# Patient Record
Sex: MALE | Race: WHITE | Employment: OTHER | URBAN - METROPOLITAN AREA
[De-identification: names, ages, dates, MRNs, and addresses within clinical notes are randomized per-mention and may not be internally consistent; named-entity substitution may affect disease eponyms.]

---

## 2017-12-04 ENCOUNTER — OFFICE VISIT (OUTPATIENT)
Dept: OPHTHALMOLOGY | Facility: CLINIC | Age: 77
End: 2017-12-04

## 2017-12-04 DIAGNOSIS — H52.13 MYOPIA, BILATERAL: ICD-10-CM

## 2017-12-04 DIAGNOSIS — H02.119 CICATRICIAL ECTROPION, UNSPECIFIED LATERALITY: Primary | ICD-10-CM

## 2017-12-04 ASSESSMENT — REFRACTION_MANIFEST
OD_AXIS: 180
OD_CYLINDER: +0.25
OS_AXIS: 045
OS_ADD: +2.50
OD_SPHERE: -0.50
OD_ADD: +2.50
OS_SPHERE: -2.25
OS_CYLINDER: +1.75

## 2017-12-04 ASSESSMENT — VISUAL ACUITY
OS_CC: 20/15-
OD_CC: 20/15-
CORRECTION_TYPE: GLASSES
METHOD: SNELLEN - LINEAR

## 2017-12-04 ASSESSMENT — CONF VISUAL FIELD
OD_NORMAL: 1
METHOD: COUNTING FINGERS
OS_NORMAL: 1

## 2017-12-04 ASSESSMENT — TONOMETRY
IOP_METHOD: ICARE
OD_IOP_MMHG: 15
OS_IOP_MMHG: 14

## 2017-12-04 ASSESSMENT — REFRACTION_WEARINGRX
OD_AXIS: 180
OS_CYLINDER: +1.75
OD_ADD: +2.50
OD_CYLINDER: +0.25
OS_SPHERE: -2.25
SPECS_TYPE: PAL
OD_SPHERE: -0.50
OS_AXIS: 045
OS_ADD: +2.50

## 2017-12-04 ASSESSMENT — CUP TO DISC RATIO
OD_RATIO: 0.25
OS_RATIO: 0.3

## 2017-12-04 NOTE — MR AVS SNAPSHOT
After Visit Summary   2017    Mariusz Murrieta    MRN: [id number]           Patient Information     Date Of Birth          1940        Visit Information        Provider Department      2017 8:20 AM Gm Morris MD [facility]        Today's Diagnoses     Cicatricial ectropion, unspecified laterality - Both Eyes    -  1    Myopia, bilateral           Follow-ups after your visit        Follow-up notes from your care team     Return in about 1 year (around 2018) for Complete Eye Exam, PCO.      Who to contact     Please call your clinic at [phone] to:    Ask questions about your health    Make or cancel appointments    Discuss your medicines    Learn about your test results    Speak to your doctor   If you have compliments or concerns about an experience at your clinic, or if you wish to file a complaint, please contact [facility] Patient Relations at [phone] or email us at [email or web address]         Additional Information About Your Visit        MyChart Information     [employer] is an electronic gateway that provides easy, online access to your medical records. With [employer], you can request a clinic appointment, read your test results, renew a prescription or communicate with your care team.     To sign up for [employer] visit the website at [email or web address]   You will be asked to enter the access code listed below, as well as some personal information. Please follow the directions to create your username and password.     Your access code is: [email or web address]  Expires: 2018  6:31 AM     Your access code will  in 90 days. If you need help or a new code, please contact your [facility] or call [phone] for assistance.        Care EveryWhere ID     This is your Care EveryWhere ID. This could be used by other organizations to access your [facility]  records  [id number]         Blood Pressure from Last 3 Encounters:   No data found for BP    Weight from Last 3 Encounters:   No data found for Wt              Today, you had the following     No orders found for display       Primary Care Provider Office Phone # Fax #    Jaun GONZALES Post [phone]       [facility] [address]  [place] [address]        Equal Access to Services     [facility] : Hadii aad ku hadasho Soomaali, waaxda luqadaha, qaybta kaalmada adeegyada, waxay idiin hayaan adeeg kharash la'aan . So [facility] [phone].    ATENCIÓN: Si habla espjacob, tiene a reed disposición servicios gratuitos de asistencia lingüística. Llame al [phone].    We comply with applicable federal civil rights laws and [place] laws. We do not discriminate on the basis of race, color, national origin, age, disability, sex, sexual orientation, or gender identity.            Thank you!     Thank you for choosing [facility]  for your care. Our goal is always to provide you with excellent care. Hearing back from our patients is one way we can continue to improve our services. Please take a few minutes to complete the written survey that you may receive in the mail after your visit with us. Thank you!             Your Updated Medication List - Protect others around you: Learn how to safely use, store and throw away your medicines at www.disposemymeds.org.          This list is accurate as of: 12/4/17  9:06 AM.  Always use your most recent med list.                   Brand Name Dispense Instructions for use Diagnosis    LIPITOR PO           WARFARIN SODIUM PO

## 2017-12-04 NOTE — NURSING NOTE
Chief Complaints and History of Present Illnesses   Patient presents with     COMPREHENSIVE EYE EXAM     HPI    Affected eye(s):  Both   Symptoms:     Difficulty with reading (Comment: feels that reading fine print has worsened recently, even with glasses on)      Duration:  1 year   Frequency:  Constant       Do you have eye pain now?:  No      Comments:  Pt here for eye health examination, notes some blurred vision at near for fine print.  No eye pain or discomfort.    JADEN Zelaya 8:23 AM 12/04/2017

## 2017-12-04 NOTE — PROGRESS NOTES
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 or sooner as needed.    Complete documentation of historical and exam elements from today's encounter can  be found in the full encounter summary report (not reduplicated in this progress  note). I personally obtained the chief complaint(s) and history of present illness. I  confirmed and edited as necessary the review of systems, past medical/surgical  history, family history, social history, and examination findings as documented by  others; and I examined the patient myself. I personally reviewed the relevant tests,  images, and reports as documented above. I formulated and edited as necessary the  assessment and plan and discussed the findings and management plan with the  patient and family.    CARLA Morris M.D

## 2018-10-16 ENCOUNTER — TELEPHONE (OUTPATIENT)
Dept: OPHTHALMOLOGY | Facility: CLINIC | Age: 78
End: 2018-10-16

## 2018-11-06 ENCOUNTER — TELEPHONE (OUTPATIENT)
Dept: OPHTHALMOLOGY | Facility: CLINIC | Age: 78
End: 2018-11-06

## 2018-11-06 ENCOUNTER — OFFICE VISIT (OUTPATIENT)
Dept: OPHTHALMOLOGY | Facility: CLINIC | Age: 78
End: 2018-11-06
Payer: MEDICARE

## 2018-11-06 DIAGNOSIS — H26.491 POSTERIOR CAPSULAR OPACIFICATION VISUALLY SIGNIFICANT, RIGHT: Primary | ICD-10-CM

## 2018-11-06 DIAGNOSIS — H52.13 MYOPIA, BILATERAL: ICD-10-CM

## 2018-11-06 DIAGNOSIS — H35.413 BILATERAL RETINAL LATTICE DEGENERATION: ICD-10-CM

## 2018-11-06 DIAGNOSIS — H02.119 CICATRICIAL ECTROPION, UNSPECIFIED LATERALITY: ICD-10-CM

## 2018-11-06 RX ORDER — ASPIRIN 81 MG/1
TABLET ORAL
COMMUNITY
Start: 2008-03-31

## 2018-11-06 RX ORDER — MULTIVIT WITH MINERALS/LUTEIN
1 TABLET ORAL
COMMUNITY
Start: 2010-07-08

## 2018-11-06 RX ORDER — SILDENAFIL 50 MG/1
50 TABLET, FILM COATED ORAL
COMMUNITY
Start: 2018-05-10

## 2018-11-06 ASSESSMENT — VISUAL ACUITY
CORRECTION_TYPE: GLASSES
OS_CC+: +2
OD_CC: J2
OD_CC+: +2
METHOD: SNELLEN - LINEAR
OS_CC: 20/25
OD_CC: 20/40
OS_CC: J2

## 2018-11-06 ASSESSMENT — REFRACTION_MANIFEST
OD_CYLINDER: +0.25
OD_AXIS: 150
OD_SPHERE: -0.50
OS_SPHERE: -2.25
OS_CYLINDER: +2.00
OS_AXIS: 050

## 2018-11-06 ASSESSMENT — CUP TO DISC RATIO
OS_RATIO: 0.3
OD_RATIO: 0.25

## 2018-11-06 ASSESSMENT — CONF VISUAL FIELD
OS_NORMAL: 1
OD_NORMAL: 1

## 2018-11-06 ASSESSMENT — EXTERNAL EXAM - RIGHT EYE: OD_EXAM: NORMAL

## 2018-11-06 ASSESSMENT — TONOMETRY
OD_IOP_MMHG: 15
OS_IOP_MMHG: 13
IOP_METHOD: ICARE

## 2018-11-06 ASSESSMENT — REFRACTION_WEARINGRX
OS_ADD: +2.50
OS_AXIS: 045
OS_CYLINDER: +1.75
SPECS_TYPE: PAL
OD_CYLINDER: +0.25
OS_SPHERE: -2.25
OD_AXIS: 180
OD_ADD: +2.50
OD_SPHERE: -0.50

## 2018-11-06 ASSESSMENT — EXTERNAL EXAM - LEFT EYE: OS_EXAM: NORMAL

## 2018-11-06 NOTE — MR AVS SNAPSHOT
After Visit Summary   2018    Mariusz Murrieta    MRN: [id number]           Patient Information     Date Of Birth          1940        Visit Information        Provider Department      2018 8:40 AM Deirdre Bahena MD 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      Your next 10 appointments already scheduled     Dec 11, 2018  8:00 AM CST   Return Visit with Deirdre Bahena MD   [facility]    [facility]  [address] [id number] [phone]              Who to contact     Please call your clinic at [phone] to:    Ask questions about your health    Make or cancel appointments    Discuss your medicines    Learn about your test results    Speak to your doctor            Additional Information About Your Visit        MyChart Information     [employer] is an electronic gateway that provides easy, online access to your medical records. With [employer], you can request a clinic appointment, read your test results, renew a prescription or communicate with your care team.     To sign up for [employer] visit the website at [email or web address]   You will be asked to enter the access code listed below, as well as some personal information. Please follow the directions to create your username and password.     Your access code is: [email or web address]  Expires: 2019  5:31 AM     Your access code will  in 90 days. If you need help or a new code, please contact  your [facility] or call [phone] for assistance.        Care EveryWhere ID     This is your Care EveryWhere ID. This could be used by other organizations to access your [place] medical records  [id number]         Blood Pressure from Last 3 Encounters:   No data found for BP    Weight from Last 3 Encounters:   No data found for Wt              Today, you had the following     No orders found for display       Primary Care Provider Office Phone # Fax #    Jaun GONZALES Post [phone]       [facility] [address]  [facility] [address]        Equal Access to Services     [facility]: Hadii aad ku hadasho Soomaali, waaxda luqadaha, qaybta kaalmada adeegyada, waxay idiin hayaan adeeg kharaevita lamahin . So [facility] [phone].    ATENCIÓN: Si habla español, tiene a reed disposición servicios gratuitos de asistencia lingüística. [facility] [phone].    We comply with applicable federal civil rights laws and [place] laws. We do not discriminate on the basis of race, color, national origin, age, disability, sex, sexual orientation, or gender identity.            Thank you!     Thank you for choosing [facility]  for your care. Our goal is always to provide you with excellent care. Hearing back from our patients is one way we can continue to improve our services. Please take a few minutes to complete the written survey that you may receive in the mail after your visit with us. Thank you!             Your Updated Medication List - Protect others around you: Learn how to safely use, store and throw away your medicines at www.disposemymeds.org.          This list is accurate as of 11/6/18 10:17 AM.  Always use your most recent med list.                   Brand Name Dispense Instructions for use Diagnosis    aspirin 81 MG EC tablet           CENTRUM SILVER per tablet      Take 1 tablet by mouth        LIPITOR PO           sildenafil 50  MG tablet    VIAGRA     Take 50 mg by mouth        VITAMIN D-1000 MAX ST 1000 units Tabs   Generic drug:  cholecalciferol      Take 1,000 Units by mouth        WARFARIN SODIUM PO

## 2018-11-06 NOTE — TELEPHONE ENCOUNTER
[facility] Call Center    Phone Message    May a detailed message be left on voicemail: yes    Reason for Call: Other: Pt states that he is scheduled for surgery on 12/11. Pt is flying to [facility] on 12/22. Pt is wondering if there are any 0problems with having this eye surgery only 10 days before flying. Please f/u with Pt to advise. Ok to leave detailed vm on phone.     Action Taken: Message routed to:  [facility]       Spoke with pt and reassured that he is cleared to fly with no restrictions per Dr. Bahena.        Cornelia DANIELLE 10:41 AM 11/07/2018

## 2018-11-06 NOTE — PROGRESS NOTES
HPI  Mariusz Murrieta is a 78 year old male here for comprehensive eye exam and noting problems with reading small print (maps).  No eye pain or new floaters.  Occasional tearing both eyes.     PMH:  Hyperlipidemia, on warfarin status-post cva   POH:  Glasses for myopia status-post cataract extraction posterior chamber intraocular lens (PCIOL) both eyes 2016, status-post laser retinopexy left eye prior to 2007, no trauma  Oc Meds:  none  FH:  enies any glaucoma, age related macular degeneration, or other known eye diseases       Assessment & Plan      (H26.491) Posterior capsular opacification visually significant, right - Right Eye  (primary encounter diagnosis)  Comment: visually significant, patient not sure if trouble reading is due to RIGHT eye   Plan: discussed with patient about YAG capsulotomy, risks, benefits, and alternatives and gave educational material.  He will think about this and possibly return for YAG capsulotomy right eye, will note if RIGHT eye is blurrier than left for reading at home.     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      -----------------------------------------------------------------------------------    Patient disposition:   Return in about 1 month (around 12/6/2018) for Follow Up- PCO YAG OD. Call for sooner appointment as needed.    Complete documentation of historical and exam elements from today's encounter can be found in the full encounter summary report (not reduplicated in this progress note). I personally obtained the chief complaint(s) and history of present  illness.  I have confirmed and edited as necessary the CC, HPI, PMH/PSH, social history, FMH, ROS, and exam/neuro findings as obtained by the technician or others. I have examined this patient myself and I personally viewed the image(s) and studies listed above and the documentation reflects my findings and interpretation.  I formulated and edited as necessary the assessment and plan and discussed the findings and management plan with the patient and family.     Deirdre Bahena MD

## 2018-12-06 ENCOUNTER — TELEPHONE (OUTPATIENT)
Dept: OPHTHALMOLOGY | Facility: CLINIC | Age: 78
End: 2018-12-06

## 2018-12-06 NOTE — TELEPHONE ENCOUNTER
"[facility] Call Center    Phone Message    May a detailed message be left on voicemail: yes    Reason for Call: Other: Pt wanted Dr Bahena to know that his history may not include his use of \"FLOWMAX\" for over two years many years ago.  Pt is concerned it may effect his 12/11 laser cornea surgery.     Action Taken: Message routed to:  [facility] eye clinic    " Bedside report received from Dudley Goodwin RN. Pt in the bed on the phone. No distress.

## 2018-12-07 NOTE — TELEPHONE ENCOUNTER
Spoke with patient and let him know that it wouldn't be a problem that he was on Flomax.    Lynn Burns, COT 3:46 PM 12/07/2018

## 2018-12-11 ENCOUNTER — OFFICE VISIT (OUTPATIENT)
Dept: OPHTHALMOLOGY | Facility: CLINIC | Age: 78
End: 2018-12-11
Payer: MEDICARE

## 2018-12-11 DIAGNOSIS — H35.413 BILATERAL RETINAL LATTICE DEGENERATION: ICD-10-CM

## 2018-12-11 DIAGNOSIS — H26.491 PCO (POSTERIOR CAPSULAR OPACIFICATION), RIGHT: Primary | ICD-10-CM

## 2018-12-11 ASSESSMENT — EXTERNAL EXAM - RIGHT EYE: OD_EXAM: NORMAL

## 2018-12-11 ASSESSMENT — CUP TO DISC RATIO
OS_RATIO: 0.3
OD_RATIO: 0.25

## 2018-12-11 ASSESSMENT — VISUAL ACUITY
OS_CC+: -2
OS_CC: 20/20
CORRECTION_TYPE: GLASSES
OD_CC: 20/30
METHOD: SNELLEN - LINEAR
OD_CC+: -1

## 2018-12-11 ASSESSMENT — CONF VISUAL FIELD
METHOD: COUNTING FINGERS
OD_NORMAL: 1
OS_NORMAL: 1

## 2018-12-11 ASSESSMENT — TONOMETRY
OD_IOP_MMHG: 11
IOP_METHOD: TONOPEN
OS_IOP_MMHG: 11

## 2018-12-11 ASSESSMENT — EXTERNAL EXAM - LEFT EYE: OS_EXAM: NORMAL

## 2018-12-11 NOTE — PROGRESS NOTES
HPI  Mariusz Murrieta is a 78 year old male here for comprehensive eye exam and noting persistent problems with reading small print (maps) with his RIGHT eye.  No eye pain or new floaters.      PMH:  Hyperlipidemia, on warfarin status-post cva   POH:  Glasses for myopia status-post cataract extraction posterior chamber intraocular lens (PCIOL) both eyes 2016, status-post laser retinopexy left eye prior to 2007, no trauma  Oc Meds:  none  FH:  enies any glaucoma, age related macular degeneration, or other known eye diseases       Assessment & Plan      (H26.491) Posterior capsular opacification visually significant, right - Right Eye  (primary encounter diagnosis)  Comment: visually significant  The risks, benefits and alternatives were discussed with patient.  Consent was signed by patient.  The patient tolerated the procedure well, and there were no apparent complications. See procedure note.    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 Call for sooner appointment as needed.    Complete documentation of historical and exam elements from today's encounter can be found in the full encounter summary report (not reduplicated in this progress note). I personally obtained the chief complaint(s) and history of present illness.  I have confirmed and edited as necessary the CC, HPI, PMH/PSH, social history, FMH, ROS, and exam/neuro findings as obtained by the technician or others. I have examined this  patient myself and I personally viewed the image(s) and studies listed above and the documentation reflects my findings and interpretation.  I formulated and edited as necessary the assessment and plan and discussed the findings and management plan with the patient and family.     Deirdre Bahena MD

## 2019-01-11 ENCOUNTER — OFFICE VISIT (OUTPATIENT)
Dept: OPHTHALMOLOGY | Facility: CLINIC | Age: 79
End: 2019-01-11
Payer: MEDICARE

## 2019-01-11 DIAGNOSIS — H26.491 PCO (POSTERIOR CAPSULAR OPACIFICATION), RIGHT: Primary | ICD-10-CM

## 2019-01-11 DIAGNOSIS — H01.003 BLEPHARITIS OF BOTH EYES, UNSPECIFIED EYELID, UNSPECIFIED TYPE: ICD-10-CM

## 2019-01-11 DIAGNOSIS — H01.006 BLEPHARITIS OF BOTH EYES, UNSPECIFIED EYELID, UNSPECIFIED TYPE: ICD-10-CM

## 2019-01-11 RX ORDER — OMEPRAZOLE 10 MG/1
20 CAPSULE, DELAYED RELEASE ORAL DAILY
COMMUNITY

## 2019-01-11 ASSESSMENT — REFRACTION_MANIFEST
OS_ADD: +2.50
OD_SPHERE: -0.75
OS_CYLINDER: +1.75
OS_AXIS: 045
OD_AXIS: 180
OS_SPHERE: -2.25
OD_CYLINDER: +0.25
OD_ADD: +2.50

## 2019-01-11 ASSESSMENT — REFRACTION_WEARINGRX
OD_SPHERE: -0.50
OS_AXIS: 045
OD_AXIS: 180
OD_ADD: +2.50
OD_CYLINDER: +0.25
OS_CYLINDER: +1.75
OS_SPHERE: -2.25
OS_ADD: +2.50

## 2019-01-11 ASSESSMENT — TONOMETRY
IOP_METHOD: TONOPEN
OS_IOP_MMHG: 11
OD_IOP_MMHG: 12

## 2019-01-11 ASSESSMENT — EXTERNAL EXAM - LEFT EYE: OS_EXAM: NORMAL

## 2019-01-11 ASSESSMENT — VISUAL ACUITY
CORRECTION_TYPE: GLASSES
OS_CC+: -2
OD_CC: 20/20
OS_CC: 20/20
METHOD: SNELLEN - LINEAR
OD_CC+: -1

## 2019-01-11 ASSESSMENT — CUP TO DISC RATIO: OD_RATIO: 0.25

## 2019-01-11 ASSESSMENT — EXTERNAL EXAM - RIGHT EYE: OD_EXAM: NORMAL

## 2019-01-11 NOTE — PROGRESS NOTES
HPI  Mariusz Murrieta is a 78 year old male here for post op YAG capsulotomy right eye. No eye pain or new floaters.  Notes intermittent blurring right eye that resolves with wiping the eye.  No foreign body sensation.    PMH:  Hyperlipidemia, on warfarin status-post cva   POH:  Glasses for myopia status-post cataract extraction posterior chamber intraocular lens (PCIOL) both eyes 2016, status-post laser retinopexy left eye prior to 2007, no trauma, status-post YAG capsulotomy right eye 12/18  Oc Meds:  none  FH:  enies any glaucoma, age related macular degeneration, or other known eye diseases       Assessment & Plan      (H26.491) Posterior capsular opacification visually significant, right - Right Eye  (primary encounter diagnosis)  Comment: status-post YAG capsulotomy vision improved  Stable fundus exam no retinal tears  Plan: patient declines new glasses, continue same    (H01.003,  H01.006) Blepharitis - Both Eyes  Comment:  Mild with tear film debris (also worse due to punctal ectropion both eyes)  Plan:   Warm compresses daily   Lid scrubs -use diluted baby shampoo on warm washcloth  or cotton-tipped applicator to clean lid margins carefully from side to side   Artificial tears up to 4-6 times per day as needed for discomfort     -----------------------------------------------------------------------------------    Patient disposition:   Return in about 1 year (around 1/11/2020). Call for sooner appointment as needed.    Complete documentation of historical and exam elements from today's encounter can be found in the full encounter summary report (not reduplicated in this progress note). I personally obtained the chief complaint(s) and history of present illness.  I have confirmed and edited as necessary the CC, HPI, PMH/PSH, social history, FMH, ROS, and exam/neuro findings as obtained by the technician or others. I have examined this patient myself and I personally viewed the image(s) and studies listed  above and the documentation reflects my findings and interpretation.  I formulated and edited as necessary the assessment and plan and discussed the findings and management plan with the patient and family.     Deirdre Bahena MD

## 2019-01-11 NOTE — NURSING NOTE
Chief Complaints and History of Present Illnesses   Patient presents with     Post Op (Ophthalmology) Right Eye     Chief Complaint(s) and History of Present Illness(es)     Post Op (Ophthalmology) Right Eye     Laterality: right eye              Comments     S/P Yag Cap RE 12-11-18. Notes that he is noticing some improvement. Denies any floaters or flashing lights. BE feel good and comfortable. RICCI WORLEY, COA 10:26 AM 01/11/2019

## 2019-12-10 ENCOUNTER — PRE VISIT (OUTPATIENT)
Dept: OPHTHALMOLOGY | Facility: CLINIC | Age: 79
End: 2019-12-10

## 2019-12-10 ENCOUNTER — OFFICE VISIT (OUTPATIENT)
Dept: OPHTHALMOLOGY | Facility: CLINIC | Age: 79
End: 2019-12-10
Payer: MEDICARE

## 2019-12-10 DIAGNOSIS — H04.123 DRY EYES, BILATERAL: ICD-10-CM

## 2019-12-10 DIAGNOSIS — Z96.1 PSEUDOPHAKIA OF BOTH EYES: Primary | ICD-10-CM

## 2019-12-10 DIAGNOSIS — H52.13 MYOPIC ASTIGMATISM OF BOTH EYES: ICD-10-CM

## 2019-12-10 DIAGNOSIS — H52.203 MYOPIC ASTIGMATISM OF BOTH EYES: ICD-10-CM

## 2019-12-10 ASSESSMENT — REFRACTION_WEARINGRX
OD_CYLINDER: +0.25
OD_AXIS: 173
OS_AXIS: 045
OD_SPHERE: -0.75
OS_CYLINDER: +1.75
OS_SPHERE: -2.25
OS_ADD: +2.50
SPECS_TYPE: PAL
OD_ADD: +2.50

## 2019-12-10 ASSESSMENT — VISUAL ACUITY
CORRECTION_TYPE: GLASSES
OD_CC: 20/20
OS_CC+: -1
OS_CC: 20/20
METHOD: SNELLEN - LINEAR
OD_CC+: -1

## 2019-12-10 ASSESSMENT — REFRACTION_MANIFEST
OD_CYLINDER: +0.50
OD_ADD: +2.50
OD_SPHERE: -0.75
OS_AXIS: 0145
OD_AXIS: 010
OS_ADD: +2.50
OS_CYLINDER: +2.00
OS_SPHERE: -2.50

## 2019-12-10 ASSESSMENT — EXTERNAL EXAM - RIGHT EYE: OD_EXAM: NORMAL

## 2019-12-10 ASSESSMENT — CONF VISUAL FIELD
OD_NORMAL: 1
OS_NORMAL: 1
METHOD: COUNTING FINGERS

## 2019-12-10 ASSESSMENT — CUP TO DISC RATIO
OS_RATIO: 0.25
OD_RATIO: 0.25

## 2019-12-10 ASSESSMENT — TONOMETRY
IOP_METHOD: APPLANATION
OS_IOP_MMHG: 14
OD_IOP_MMHG: 15

## 2019-12-10 ASSESSMENT — EXTERNAL EXAM - LEFT EYE: OS_EXAM: NORMAL

## 2019-12-10 NOTE — TELEPHONE ENCOUNTER
FUTURE VISIT INFORMATION      FUTURE VISIT INFORMATION:    Date: 12/10/19    Time: 9:00am    [place] [facility] [place]FORMATION:    Referring provider:  Self    Referring providers clinic:  N/A    Reason for visit/diagnosis  Annual eye exam    RECORDS REQUESTED FROM:       Clinic name Comments Records Status Imaging Status   [facility] OV/notes10/6/15-1/11/19 EPIC

## 2019-12-10 NOTE — NURSING NOTE
Chief Complaint(s) and History of Present Illness(es)     Annual Eye Exam     In both eyes.  Associated symptoms include tearing.  Negative for dryness, eye pain and redness.  Pain was noted as 0/10.              Comments     Pt is here for his yearly eye exam. Pt notes some blurriness when reading intermittently with current glasses over the last year or so. Pt still notes BE tearing RE>LE x the last year. Dist vision is good with glasses.    Ocular meds: none    ROLANDO Bolanos 8:59 AM December 10, 2019

## 2019-12-10 NOTE — PROGRESS NOTES
HPI  Mariusz Murrieta is a 79 year old male here for comprehensive eye exam.  Good distance visual acuity with current glasses and sometimes ok to read.  Does have intermittent blurring right eye that resolves with wiping the eye and gets blurry both eyes with reading sometimes (after reading for awhile)  No eye pain or redness.     PMH:  Hyperlipidemia, on warfarin status-post cva   POH:  Glasses for myopia status-post cataract extraction posterior chamber intraocular lens (PCIOL) both eyes 2016, status-post laser retinopexy left eye prior to 2007, no trauma, status-post YAG capsulotomy right eye 12/18, epiphora R>L  Oc Meds:  none  FH:  Denies any glaucoma, age related macular degeneration, or other known eye diseases       Assessment & Plan      (Z96.1) Pseudophakia of both eyes - Both Eyes  (primary encounter diagnosis)  Comment: clear media   Plan: follow    (H52.203,  H52.13) Myopic astigmatism of both eyes - Both Eyes  Comment: minimal change   Plan: manifest refraction done and prescription for glasses given     (H04.123) Dry eyes, bilateral - Both Eyes  Comment: mild no cornea signs   Likely cause of blurring when reading  Plan: trial of artificial tear drops a few minutes before reading see if time can extend   Can use four times a day both eyes   Consider humidifier in winter     -----------------------------------------------------------------------------------    Patient disposition:   Return in about 1 year (around 12/10/2020) for Comprehensive Exam. Call for sooner appointment as needed.    Complete documentation of historical and exam elements from today's encounter can be found in the full encounter summary report (not reduplicated in this progress note). I personally obtained the chief complaint(s) and history of present illness.  I have confirmed and edited as necessary the CC, HPI, PMH/PSH, social history, FMH, ROS, and exam/neuro findings as obtained by the technician or others. I have examined  this patient myself and I personally viewed the image(s) and studies listed above and the documentation reflects my findings and interpretation.  I formulated and edited as necessary the assessment and plan and discussed the findings and management plan with the patient and family.     Deirdre Bahena MD

## 2020-09-23 ENCOUNTER — APPOINTMENT (OUTPATIENT)
Dept: URBAN - METROPOLITAN AREA CLINIC 259 | Age: 80
Setting detail: DERMATOLOGY
End: 2020-09-23

## 2020-09-23 DIAGNOSIS — D22 MELANOCYTIC NEVI: ICD-10-CM

## 2020-09-23 DIAGNOSIS — L57.0 ACTINIC KERATOSIS: ICD-10-CM

## 2020-09-23 DIAGNOSIS — L81.4 OTHER MELANIN HYPERPIGMENTATION: ICD-10-CM

## 2020-09-23 DIAGNOSIS — L40.0 PSORIASIS VULGARIS: ICD-10-CM

## 2020-09-23 DIAGNOSIS — L82.1 OTHER SEBORRHEIC KERATOSIS: ICD-10-CM

## 2020-09-23 DIAGNOSIS — Z80.8 FAMILY HISTORY OF MALIGNANT NEOPLASM OF OTHER ORGANS OR SYSTEMS: ICD-10-CM

## 2020-09-23 DIAGNOSIS — L57.8 OTHER SKIN CHANGES DUE TO CHRONIC EXPOSURE TO NONIONIZING RADIATION: ICD-10-CM

## 2020-09-23 PROBLEM — D22.5 MELANOCYTIC NEVI OF TRUNK: Status: ACTIVE | Noted: 2020-09-23

## 2020-09-23 PROCEDURE — 17000 DESTRUCT PREMALG LESION: CPT

## 2020-09-23 PROCEDURE — 17003 DESTRUCT PREMALG LES 2-14: CPT

## 2020-09-23 PROCEDURE — OTHER LIQUID NITROGEN: OTHER

## 2020-09-23 PROCEDURE — OTHER COUNSELING: OTHER

## 2020-09-23 PROCEDURE — 99214 OFFICE O/P EST MOD 30 MIN: CPT | Mod: 25

## 2020-09-23 ASSESSMENT — LOCATION DETAILED DESCRIPTION DERM
LOCATION DETAILED: RIGHT KNEE
LOCATION DETAILED: RIGHT MID TEMPLE
LOCATION DETAILED: RIGHT SUPERIOR LATERAL MALAR CHEEK
LOCATION DETAILED: LEFT SUPERIOR UPPER BACK
LOCATION DETAILED: LEFT KNEE
LOCATION DETAILED: LEFT INFERIOR UPPER BACK
LOCATION DETAILED: LEFT MEDIAL UPPER BACK

## 2020-09-23 ASSESSMENT — LOCATION SIMPLE DESCRIPTION DERM
LOCATION SIMPLE: RIGHT TEMPLE
LOCATION SIMPLE: LEFT KNEE
LOCATION SIMPLE: RIGHT KNEE
LOCATION SIMPLE: RIGHT CHEEK
LOCATION SIMPLE: LEFT UPPER BACK

## 2020-09-23 ASSESSMENT — LOCATION ZONE DERM
LOCATION ZONE: FACE
LOCATION ZONE: LEG
LOCATION ZONE: TRUNK

## 2020-09-23 NOTE — PROCEDURE: LIQUID NITROGEN
Detail Level: Simple
Render Post-Care Instructions In Note?: no
Duration Of Freeze Thaw-Cycle (Seconds): 1
Number Of Freeze-Thaw Cycles: 1 freeze-thaw cycle
Consent: The patient's consent was obtained including but not limited to risks of crusting, scabbing, blistering, scarring, darker or lighter pigmentary change, recurrence, incomplete removal and infection.
Post-Care Instructions: I reviewed with the patient in detail post-care instructions. Patient is to wear sunprotection, and avoid picking at any of the treated lesions. Pt may apply Vaseline to crusted or scabbing areas.

## 2020-12-07 NOTE — PROGRESS NOTES
HPI  Mariusz Murrieta is a 80 year old male here for comprehensive eye exam.  Good distance visual acuity with current glasses with some problems with very small print up close.  Longstanding intermittent tearing right eye more than left eye.   Does not feel eyes are dry lately.  No new flashes/floaters.    PMH:  Hyperlipidemia, on warfarin status-post cva   POH:  Glasses for myopia status-post cataract extraction posterior chamber intraocular lens (PCIOL) both eyes 2016, status-post laser retinopexy left eye prior to 2007, no trauma, status-post YAG capsulotomy right eye 12/18, epiphora R>L  Oc Meds:  none  FH:  Denies any glaucoma, age related macular degeneration, or other known eye diseases       Assessment & Plan        (Z96.1) Pseudophakia of both eyes - Both Eyes  (primary encounter diagnosis)  Comment: clear, good visual acuity   Plan: follow    (H52.203,  H52.13) Myopic astigmatism of both eyes - Both Eyes  Comment: minimal changes, can increase add a little but also recommend adjusting glasses for occasional very small print (likes smaller frame don't go up more on add)  Plan:      Manifest refraction done and prescription for glasses given     (H04.123) Dry eyes, bilateral - Both Eyes  Comment: no cornea signs, likely small component of dry eye /reflex tearing but more likely tearing due to ectropion  Plan: artificial tear drops before reading as needed and oculoplastics evaluation as desired to determine if surgery indicated    (H26.491) PCO (posterior capsular opacification), left eye  Comment: mild   Plan: follow, call with worsening vision    (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  Call for sooner appointment as needed.    Complete documentation of historical and exam elements from today's encounter can be found in the full encounter summary report (not reduplicated in this progress note). I personally obtained the chief complaint(s) and history of present illness.  I have confirmed and edited as necessary the CC, HPI, PMH/PSH, social history, FMH, ROS, and exam/neuro findings as obtained by the technician or others. I have examined this patient myself and I personally viewed the image(s) and studies listed above and the documentation reflects my findings and interpretation.  I formulated and edited as necessary the assessment and plan and discussed the findings and management plan with the patient and family.     Deirdre Bahena MD

## 2020-12-08 ENCOUNTER — OFFICE VISIT (OUTPATIENT)
Dept: OPHTHALMOLOGY | Facility: CLINIC | Age: 80
End: 2020-12-08
Payer: MEDICARE

## 2020-12-08 DIAGNOSIS — H52.13 MYOPIC ASTIGMATISM OF BOTH EYES: ICD-10-CM

## 2020-12-08 DIAGNOSIS — Z96.1 PSEUDOPHAKIA OF BOTH EYES: Primary | ICD-10-CM

## 2020-12-08 DIAGNOSIS — H52.203 MYOPIC ASTIGMATISM OF BOTH EYES: ICD-10-CM

## 2020-12-08 DIAGNOSIS — H04.123 DRY EYES, BILATERAL: ICD-10-CM

## 2020-12-08 DIAGNOSIS — H26.491 PCO (POSTERIOR CAPSULAR OPACIFICATION), RIGHT: ICD-10-CM

## 2020-12-08 DIAGNOSIS — H35.413 BILATERAL RETINAL LATTICE DEGENERATION: ICD-10-CM

## 2020-12-08 PROCEDURE — 92014 COMPRE OPH EXAM EST PT 1/>: CPT | Performed by: OPHTHALMOLOGY

## 2020-12-08 PROCEDURE — 92015 DETERMINE REFRACTIVE STATE: CPT | Mod: GY | Performed by: OPHTHALMOLOGY

## 2020-12-08 ASSESSMENT — TONOMETRY
OD_IOP_MMHG: 13
OS_IOP_MMHG: 12
IOP_METHOD: ICARE

## 2020-12-08 ASSESSMENT — VISUAL ACUITY
OS_CC+: -2
CORRECTION_TYPE: GLASSES
METHOD: SNELLEN - LINEAR
OS_CC: 20/20
OD_CC: 20/20

## 2020-12-08 ASSESSMENT — REFRACTION_MANIFEST
OS_ADD: +2.50
OD_ADD: +2.50
OS_CYLINDER: +2.00
OD_SPHERE: -0.75
OD_CYLINDER: +0.50
OD_AXIS: 173
OS_AXIS: 045
OS_SPHERE: -2.75

## 2020-12-08 ASSESSMENT — CUP TO DISC RATIO
OS_RATIO: 0.25
OD_RATIO: 0.25

## 2020-12-08 ASSESSMENT — REFRACTION_WEARINGRX
OS_ADD: +2.50
OS_CYLINDER: +1.75
SPECS_TYPE: PAL
OS_AXIS: 045
OS_SPHERE: -2.25
OD_SPHERE: -0.75
OD_AXIS: 173
OD_CYLINDER: +0.25
OD_ADD: +2.50

## 2020-12-08 ASSESSMENT — EXTERNAL EXAM - RIGHT EYE: OD_EXAM: NORMAL

## 2020-12-08 ASSESSMENT — CONF VISUAL FIELD
METHOD: COUNTING FINGERS
OS_NORMAL: 1
OD_NORMAL: 1

## 2020-12-08 ASSESSMENT — EXTERNAL EXAM - LEFT EYE: OS_EXAM: NORMAL

## 2020-12-08 NOTE — NURSING NOTE
Chief Complaints and History of Present Illnesses   Patient presents with     Follow Up     Pseudophakia of both eyes - Both Eyes      Chief Complaint(s) and History of Present Illness(es)     Follow Up     Laterality: both eyes    Onset: gradual    Onset: years ago    Course: stable    Associated symptoms: tearing and floaters.  Negative for eye pain, dryness, flashes, glare and haloes    Pain scale: 0/10    Comments: Pseudophakia of both eyes - Both Eyes               Comments     Pt states vision is stable except near vision feels a little worse.  Floaters are stable.    SHASHI Johnson December 8, 2020 8:49 AM

## 2021-10-18 ENCOUNTER — TELEPHONE (OUTPATIENT)
Dept: OPHTHALMOLOGY | Facility: CLINIC | Age: 81
End: 2021-10-18

## 2021-10-18 NOTE — TELEPHONE ENCOUNTER
Spoke with patient regarding scheduling for a sooner appointment from the wait-list. Patient declined scheduling and asked to cancel current appointment. Cancelled appointment as requested and patient declined scheduling at this time as he is looking into Surgery options.-Per Patient

## 2021-11-04 ENCOUNTER — APPOINTMENT (OUTPATIENT)
Dept: URBAN - METROPOLITAN AREA CLINIC 257 | Age: 81
Setting detail: DERMATOLOGY
End: 2021-11-04

## 2021-11-04 DIAGNOSIS — D22 MELANOCYTIC NEVI: ICD-10-CM

## 2021-11-04 DIAGNOSIS — L81.4 OTHER MELANIN HYPERPIGMENTATION: ICD-10-CM

## 2021-11-04 DIAGNOSIS — Z71.89 OTHER SPECIFIED COUNSELING: ICD-10-CM

## 2021-11-04 DIAGNOSIS — L57.8 OTHER SKIN CHANGES DUE TO CHRONIC EXPOSURE TO NONIONIZING RADIATION: ICD-10-CM

## 2021-11-04 DIAGNOSIS — D18.0 HEMANGIOMA: ICD-10-CM

## 2021-11-04 DIAGNOSIS — L72.8 OTHER FOLLICULAR CYSTS OF THE SKIN AND SUBCUTANEOUS TISSUE: ICD-10-CM

## 2021-11-04 DIAGNOSIS — L57.0 ACTINIC KERATOSIS: ICD-10-CM

## 2021-11-04 DIAGNOSIS — L82.1 OTHER SEBORRHEIC KERATOSIS: ICD-10-CM

## 2021-11-04 DIAGNOSIS — Z80.8 FAMILY HISTORY OF MALIGNANT NEOPLASM OF OTHER ORGANS OR SYSTEMS: ICD-10-CM

## 2021-11-04 PROBLEM — D22.5 MELANOCYTIC NEVI OF TRUNK: Status: ACTIVE | Noted: 2021-11-04

## 2021-11-04 PROBLEM — D18.01 HEMANGIOMA OF SKIN AND SUBCUTANEOUS TISSUE: Status: ACTIVE | Noted: 2021-11-04

## 2021-11-04 PROCEDURE — OTHER REASSURANCE: OTHER

## 2021-11-04 PROCEDURE — OTHER LIQUID NITROGEN: OTHER

## 2021-11-04 PROCEDURE — 99213 OFFICE O/P EST LOW 20 MIN: CPT | Mod: 25

## 2021-11-04 PROCEDURE — OTHER COUNSELING: OTHER

## 2021-11-04 PROCEDURE — 17000 DESTRUCT PREMALG LESION: CPT

## 2021-11-04 PROCEDURE — OTHER MIPS QUALITY: OTHER

## 2021-11-04 ASSESSMENT — LOCATION ZONE DERM
LOCATION ZONE: SCALP
LOCATION ZONE: LIP
LOCATION ZONE: TRUNK

## 2021-11-04 ASSESSMENT — LOCATION SIMPLE DESCRIPTION DERM
LOCATION SIMPLE: RIGHT UPPER BACK
LOCATION SIMPLE: UPPER BACK
LOCATION SIMPLE: SCALP
LOCATION SIMPLE: LEFT UPPER BACK
LOCATION SIMPLE: LEFT LIP

## 2021-11-04 ASSESSMENT — LOCATION DETAILED DESCRIPTION DERM
LOCATION DETAILED: LEFT INFERIOR MEDIAL UPPER BACK
LOCATION DETAILED: LEFT SUPERIOR PARIETAL SCALP
LOCATION DETAILED: RIGHT SUPERIOR MEDIAL UPPER BACK
LOCATION DETAILED: LEFT MEDIAL UPPER BACK
LOCATION DETAILED: LEFT LOWER CUTANEOUS LIP
LOCATION DETAILED: INFERIOR THORACIC SPINE

## 2021-11-04 NOTE — PROCEDURE: MIPS QUALITY
Quality 130: Documentation Of Current Medications In The Medical Record: Current Medications Documented
Quality 431: Preventive Care And Screening: Unhealthy Alcohol Use - Screening: Patient not identified as an unhealthy alcohol user when screened for unhealthy alcohol use using a systematic screening method
Detail Level: Generalized
Quality 226: Preventive Care And Screening: Tobacco Use: Screening And Cessation Intervention: Patient screened for tobacco use and is an ex/non-smoker
Quality 110: Preventive Care And Screening: Influenza Immunization: Influenza Immunization Ordered or Recommended, but not Administered due to system reason

## 2021-11-04 NOTE — PROCEDURE: LIQUID NITROGEN
Consent: The patient's consent was obtained including but not limited to risks of crusting, scabbing, blistering, scarring, darker or lighter pigmentary change, recurrence, incomplete removal and infection.
Show Applicator Variable?: Yes
Number Of Freeze-Thaw Cycles: 1 freeze-thaw cycle
Post-Care Instructions: I reviewed with the patient in detail post-care instructions. Patient is to wear sunprotection, and avoid picking at any of the treated lesions. Pt may apply Vaseline to crusted or scabbing areas.
Render Post-Care Instructions In Note?: no
Detail Level: Detailed
Duration Of Freeze Thaw-Cycle (Seconds): 1

## 2022-04-04 ENCOUNTER — APPOINTMENT (OUTPATIENT)
Dept: URBAN - METROPOLITAN AREA CLINIC 259 | Age: 82
Setting detail: DERMATOLOGY
End: 2022-04-04

## 2022-04-04 DIAGNOSIS — L57.8 OTHER SKIN CHANGES DUE TO CHRONIC EXPOSURE TO NONIONIZING RADIATION: ICD-10-CM

## 2022-04-04 DIAGNOSIS — L82.1 OTHER SEBORRHEIC KERATOSIS: ICD-10-CM

## 2022-04-04 PROBLEM — D48.5 NEOPLASM OF UNCERTAIN BEHAVIOR OF SKIN: Status: ACTIVE | Noted: 2022-04-04

## 2022-04-04 PROCEDURE — OTHER MIPS QUALITY: OTHER

## 2022-04-04 PROCEDURE — OTHER BIOPSY BY SHAVE METHOD: OTHER

## 2022-04-04 PROCEDURE — 99213 OFFICE O/P EST LOW 20 MIN: CPT | Mod: 25

## 2022-04-04 PROCEDURE — OTHER COUNSELING: OTHER

## 2022-04-04 PROCEDURE — 11102 TANGNTL BX SKIN SINGLE LES: CPT

## 2022-04-04 ASSESSMENT — LOCATION ZONE DERM
LOCATION ZONE: FACE
LOCATION ZONE: NECK

## 2022-04-04 ASSESSMENT — LOCATION SIMPLE DESCRIPTION DERM
LOCATION SIMPLE: RIGHT CHEEK
LOCATION SIMPLE: NECK

## 2022-04-04 ASSESSMENT — LOCATION DETAILED DESCRIPTION DERM
LOCATION DETAILED: RIGHT SUPERIOR LATERAL NECK
LOCATION DETAILED: RIGHT INFERIOR CENTRAL MALAR CHEEK

## 2022-04-04 NOTE — PROCEDURE: MIPS QUALITY
Quality 431: Preventive Care And Screening: Unhealthy Alcohol Use - Screening: Patient screened for unhealthy alcohol use using a single question and scores less than 2 times per year
Quality 111:Pneumonia Vaccination Status For Older Adults: Pneumococcal Vaccination not Administered or Previously Received, Reason not Otherwise Specified
Quality 110: Preventive Care And Screening: Influenza Immunization: Influenza Immunization Ordered or Recommended, but not Administered due to system reason
Quality 130: Documentation Of Current Medications In The Medical Record: Current Medications Documented
Quality 226: Preventive Care And Screening: Tobacco Use: Screening And Cessation Intervention: Patient screened for tobacco use and is an ex/non-smoker
Detail Level: Detailed

## 2022-04-04 NOTE — PROCEDURE: BIOPSY BY SHAVE METHOD
Hide Anticipated Plan (Based On Presumed Biopsy Results)?: No
Size Of Lesion In Cm: 0
Wound Care: Petrolatum
Anesthesia Volume In Cc (Will Not Render If 0): 0.5
Silver Nitrate Text: The wound bed was treated with silver nitrate after the biopsy was performed.
Dressing: bandage
Notification Instructions: Patient will be notified of biopsy results. However, patient instructed to call the office if not contacted within 2 weeks.
Hemostasis: Drysol
Anesthesia Type: 1% lidocaine with epinephrine
Detail Level: Detailed
Cryotherapy Text: The wound bed was treated with cryotherapy after the biopsy was performed.
Type Of Destruction Used: Curettage
Electrodesiccation And Curettage Text: The wound bed was treated with electrodesiccation and curettage after the biopsy was performed.
Post-Care Instructions: I reviewed with the patient in detail post-care instructions. Patient is to keep the biopsy site dry overnight, and then apply bacitracin twice daily until healed. Patient may apply hydrogen peroxide soaks to remove any crusting.
Was A Bandage Applied: Yes
Curettage Text: The wound bed was treated with curettage after the biopsy was performed.
Body Location Override (Optional - Billing Will Still Be Based On Selected Body Map Location If Applicable): Right lateral dorsal hand
Information: Selecting Yes will display possible errors in your note based on the variables you have selected. This validation is only offered as a suggestion for you. PLEASE NOTE THAT THE VALIDATION TEXT WILL BE REMOVED WHEN YOU FINALIZE YOUR NOTE. IF YOU WANT TO FAX A PRELIMINARY NOTE YOU WILL NEED TO TOGGLE THIS TO 'NO' IF YOU DO NOT WANT IT IN YOUR FAXED NOTE.
Electrodesiccation Text: The wound bed was treated with electrodesiccation after the biopsy was performed.
Biopsy Method: Dermablade
Consent: Written consent was obtained and risks were reviewed including but not limited to scarring, infection, bleeding, scabbing, incomplete removal, nerve damage and allergy to anesthesia.
Billing Type: Third-Party Bill
Depth Of Biopsy: dermis
Biopsy Type: H and E

## 2022-05-10 ENCOUNTER — APPOINTMENT (OUTPATIENT)
Dept: URBAN - METROPOLITAN AREA CLINIC 259 | Age: 82
Setting detail: DERMATOLOGY
End: 2022-05-10

## 2022-05-10 DIAGNOSIS — L57.8 OTHER SKIN CHANGES DUE TO CHRONIC EXPOSURE TO NONIONIZING RADIATION: ICD-10-CM

## 2022-05-10 PROBLEM — C44.622 SQUAMOUS CELL CARCINOMA OF SKIN OF RIGHT UPPER LIMB, INCLUDING SHOULDER: Status: ACTIVE | Noted: 2022-05-10

## 2022-05-10 PROCEDURE — OTHER MIPS QUALITY: OTHER

## 2022-05-10 PROCEDURE — OTHER COUNSELING: OTHER

## 2022-05-10 PROCEDURE — 13132 CMPLX RPR F/C/C/M/N/AX/G/H/F: CPT

## 2022-05-10 PROCEDURE — 17311 MOHS 1 STAGE H/N/HF/G: CPT

## 2022-05-10 PROCEDURE — OTHER MOHS BY LAYER: OTHER

## 2022-05-10 PROCEDURE — 99212 OFFICE O/P EST SF 10 MIN: CPT | Mod: 25

## 2022-05-10 ASSESSMENT — LOCATION ZONE DERM: LOCATION ZONE: TRUNK

## 2022-05-10 ASSESSMENT — LOCATION SIMPLE DESCRIPTION DERM: LOCATION SIMPLE: UPPER BACK

## 2022-05-10 ASSESSMENT — LOCATION DETAILED DESCRIPTION DERM: LOCATION DETAILED: INFERIOR THORACIC SPINE

## 2022-05-10 NOTE — PROCEDURE: MOHS BY LAYER
Hemostasis: Electrocautery
Debridement Text: The wound edges were debrided prior to proceeding with the closure to facilitate wound healing.
Body Location Override (Optional - Billing Will Still Be Based On Selected Body Map Location If Applicable): Right Lateral Dorsal Hand
Repair Type: Complex Repair
Wound Care: Petrolatum
Stage 2: Number Of Sections (Blocks) ?: 0
Number Of Stages: 1
Epidermal Sutures: 4-0 Nylon
Post-Care Instructions: I reviewed with the patient in detail post-care instructions. Patient is not to engage in any heavy lifting, exercise, or swimming for the next 14 days. Should the patient develop any fevers, chills, bleeding, severe pain patient will contact the office immediately.
Suture Removal: 10 days
Simple / Intermediate / Complex Repair - Final Wound Length In Cm: 4.5
Vermilion Border Text: The closure involved the vermilion border.
Deep Sutures: 4-0 Vicryl
Complex Requirements: Exposure Of Vital Structure?: No
Detail Level: Detailed
Retention Suture Text: Retention sutures were placed to support the closure and prevent dehiscence.
Complex Requirements: Debridement Of Wound Edges?: Yes
Helical Rim Text: The closure involved the helical rim.
Size Of Lesion: 1.2
Consent: The rationale for Mohs was explained to the patient and consent was obtained. The risks, benefits and alternatives to therapy were discussed in detail. Specifically, the risks of infection, scarring, bleeding, prolonged wound healing, incomplete removal, allergy to anesthesia, nerve injury and recurrence were addressed. Prior to the procedure, the treatment site was clearly identified and confirmed by the patient. All components of Universal Protocol/PAUSE Rule completed.
Epidermal Closure: simple interrupted
Anesthesia Type: 1% lidocaine with epinephrine
Stage 1: Defect X-Dimension: 1.8
Nostril Rim Text: The closure involved the nostril rim.
Stage 1: Number Of Sections (Blocks) ?: 2
Estimated Blood Loss (Cc): minimal
Undermining Type: Entire Wound
Stage 1: Depth Of Layer: adipose tissue
Stage 1: Defect Y-Dimension: 1.6

## 2022-05-10 NOTE — PROCEDURE: MIPS QUALITY
Detail Level: Detailed
Quality 431: Preventive Care And Screening: Unhealthy Alcohol Use - Screening: Patient screened for unhealthy alcohol use using a single question and scores less than 2 times per year
Quality 130: Documentation Of Current Medications In The Medical Record: Current Medications Documented
Quality 226: Preventive Care And Screening: Tobacco Use: Screening And Cessation Intervention: Patient screened for tobacco use and is an ex/non-smoker
Quality 110: Preventive Care And Screening: Influenza Immunization: Influenza Immunization Ordered or Recommended, but not Administered due to system reason
Quality 111:Pneumonia Vaccination Status For Older Adults: Pneumococcal Vaccination not Administered or Previously Received, Reason not Otherwise Specified

## 2022-05-20 ENCOUNTER — APPOINTMENT (OUTPATIENT)
Dept: URBAN - METROPOLITAN AREA CLINIC 259 | Age: 82
Setting detail: DERMATOLOGY
End: 2022-05-23

## 2022-05-20 DIAGNOSIS — Z48.02 ENCOUNTER FOR REMOVAL OF SUTURES: ICD-10-CM

## 2022-05-20 PROCEDURE — 99024 POSTOP FOLLOW-UP VISIT: CPT

## 2022-05-20 PROCEDURE — OTHER SUTURE REMOVAL (GLOBAL PERIOD): OTHER

## 2022-05-20 ASSESSMENT — LOCATION SIMPLE DESCRIPTION DERM: LOCATION SIMPLE: RIGHT HAND

## 2022-05-20 ASSESSMENT — LOCATION DETAILED DESCRIPTION DERM: LOCATION DETAILED: RIGHT RADIAL DORSAL HAND

## 2022-05-20 ASSESSMENT — LOCATION ZONE DERM: LOCATION ZONE: HAND

## 2022-05-20 NOTE — PROCEDURE: SUTURE REMOVAL (GLOBAL PERIOD)
Add 47918 Cpt? (Important Note: In 2017 The Use Of 54499 Is Being Tracked By Cms To Determine Future Global Period Reimbursement For Global Periods): yes
Detail Level: Detailed